# Patient Record
Sex: MALE | Race: OTHER | HISPANIC OR LATINO | Employment: FULL TIME | URBAN - METROPOLITAN AREA
[De-identification: names, ages, dates, MRNs, and addresses within clinical notes are randomized per-mention and may not be internally consistent; named-entity substitution may affect disease eponyms.]

---

## 2017-12-02 ENCOUNTER — HOSPITAL ENCOUNTER (EMERGENCY)
Facility: HOSPITAL | Age: 27
Discharge: HOME | End: 2017-12-02
Attending: EMERGENCY MEDICINE | Admitting: EMERGENCY MEDICINE

## 2017-12-02 VITALS
TEMPERATURE: 97 F | WEIGHT: 166 LBS | HEART RATE: 86 BPM | RESPIRATION RATE: 16 BRPM | OXYGEN SATURATION: 97 % | SYSTOLIC BLOOD PRESSURE: 124 MMHG | DIASTOLIC BLOOD PRESSURE: 73 MMHG

## 2017-12-02 DIAGNOSIS — R19.7 NAUSEA, VOMITING, AND DIARRHEA: Primary | ICD-10-CM

## 2017-12-02 DIAGNOSIS — R11.2 NAUSEA, VOMITING, AND DIARRHEA: Primary | ICD-10-CM

## 2017-12-02 DIAGNOSIS — H61.20 CERUMEN IMPACTION: ICD-10-CM

## 2017-12-02 DIAGNOSIS — H92.02 LEFT EAR PAIN: ICD-10-CM

## 2017-12-02 PROCEDURE — 99283 EMERGENCY DEPT VISIT LOW MDM: CPT

## 2017-12-02 RX ORDER — ONDANSETRON 4 MG/1
4 TABLET, FILM COATED ORAL EVERY 6 HOURS
Qty: 6 TABLET | Refills: 0 | Status: SHIPPED | OUTPATIENT
Start: 2017-12-02 | End: 2018-07-15

## 2017-12-02 RX ORDER — DICYCLOMINE HCL 20 MG
20 TABLET ORAL 2 TIMES DAILY
Qty: 10 TABLET | Refills: 0 | Status: SHIPPED | OUTPATIENT
Start: 2017-12-02 | End: 2018-07-15

## 2017-12-02 RX ORDER — DICYCLOMINE HYDROCHLORIDE 10 MG/1
20 CAPSULE ORAL ONCE
Status: COMPLETED | OUTPATIENT
Start: 2017-12-02 | End: 2017-12-02

## 2017-12-02 RX ORDER — ONDANSETRON 4 MG/1
4 TABLET, ORALLY DISINTEGRATING ORAL ONCE
Status: COMPLETED | OUTPATIENT
Start: 2017-12-02 | End: 2017-12-02

## 2017-12-02 RX ADMIN — ONDANSETRON 4 MG: 4 TABLET, ORALLY DISINTEGRATING ORAL at 12:53

## 2017-12-02 RX ADMIN — DICYCLOMINE HYDROCHLORIDE 20 MG: 10 CAPSULE ORAL at 12:52

## 2017-12-02 NOTE — DISCHARGE INSTRUCTIONS
Impactación de Cerumen   LO QUE NECESITA SABER:   La impactación de cerumen es la obstrucción del conducto auditivo externo con cerumen (cera del oído) comprimido apretadamente  Generalmente se trata con procedimientos nasir irrigación o succión del canal auditivo o con el uso de instrumentos para remover la impactación  INSTRUCCIONES SOBRE EL IZABELA HOSPITALARIA:   Medicamentos:  · Gotas óticas:  Se utiliza las gotas de oído para ablandar la cera en maria oído  Las gotas óticas para ablandar la cera del oído son de [place]  Consulte con maria médico qué tan seguido debe Lady Kamara  Brandy las instrucciones cuidadosamente antes de [employer]  Swathi lo siguiente cuando se coloque gotas para ojos:     ¨ Tibie las gotas sosteniendo el envase en las elias julio unos minutos  Las gotas frías podrían [place] [employer]  ¨ Acuéstese con el oído afectado [place]  Usted también se puede parar con maria lucretia inclinada hacia un lado  ¨ Jale el lóbulo del oído [place] y atrás, y coloque la cantidad de gotas adecuadas en el oído  ¨ Mantenga maria lucretia inclinada por 5 a 10 minutos para que las gotas cubran la parte exterior del canal auditivo  ¨ Limpie la parte exterior del oído con un bastoncillo de algodón  No coloque el bastoncillo ni cualquier otra cosa dentro de maria canal auditivo  [place] aumenta el riesgo de daño a maria tímpano  · [place] cat medicamentos nasir se le haya indicado  Consulte con maria médico si usted christa que maria medicamento no le está ayudando o si presenta efectos secundarios  Infórmele si es alérgico a algún medicamento  Mantenga chandu lista actualizada de los [place], las vitaminas y los productos herbales que lorenzo  Incluya los siguientes datos de los medicamentos: cantidad, frecuencia y motivo de administración  Traiga con usted la lista o los envases de la píldoras a cat citas de seguimiento   Lleve la lista de los medicamentos con usted en carolyn de [place] emergencia  Acuda a cat consultas de control con ck médico según le indicaron  Anote cat preguntas para que se acuerde de hacerlas julio cat visitas  Pregúntele a kc Demetra Kay vitaminas y minerales son adecuados para usted  · Usted tiene fiebre  · Usted tiene dificultad para oír u oye zumbidos  · Tiene alguna pregunta acerca de kc condición o cuidado  Regrese a la emigdio de emergencias si:   · Usted se siente mareado  · Usted tiene pus o benita que proviene de kc oído  · Kc dolor de oído no desaparece o aumenta  © 2017 2600 Mark Smith Information is for End User's use only and may not be sold, redistributed or otherwise used for commercial purposes  All illustrations and images included in CareNotes® are the copyrighted property of A D A M , Inc  or Osmany Reina  Esta información es sólo para uso en educación  Kc intención no es darle un consejo médico sobre enfermedades o tratamientos  Colsulte con kc Daily Every farmacéutico antes de seguir cualquier régimen médico para saber si es seguro y efectivo para usted  Gastroenteritis   LO QUE NECESITA SABER:   La gastroenteritis, o gripe estomacal, es chandu infección del estómago y los intestinos  INSTRUCCIONES SOBRE EL IZABELA HOSPITALARIA:   Llame al 911 en carolyn de presentar lo siguiente:   · Usted tiene dificultad para respirar o pulso acelerado  Regrese a la emigdio de emergencias si:   · Usted ve benita en kc diarrea  · Usted no puede dejar de vomitar  · Usted no ha orinado en 12 horas  · Usted siente que se va a desmayar  Pregúntele a kc Demetra Aky vitaminas y minerales son adecuados para usted  · Usted tiene fiebre  · Usted continúa con vómitos o diarrea aún después del tratamiento  · Usted ve lombrices en kc diarrea  · Kc boca u ojos están secos  Usted no está orinando tanto o con la misma frecuencia      · Usted tiene preguntas o inquietudes acerca de kc condición o cuidado  Medicamentos:   · Medicamentos,  se pueden administrar para [employer] vómitos o la diarrea, disminuir los calambres abdominales o tratar chandu infección  · [place] cat medicamentos nasir se le haya indicado  Consulte con maria médico si usted christa que maria medicamento no le está ayudando o si presenta efectos secundarios  Infórmele si es alérgico a cualquier medicamento  Mantenga chandu lista actualizada de los [place], las vitaminas y los productos herbales que lorenzo  Incluya los siguientes datos de los medicamentos: cantidad, frecuencia y motivo de administración  Traiga con usted la lista o los envases de la píldoras a cat citas de seguimiento  Lleve la lista de los medicamentos con usted en carolyn de chandu emergencia  [place] de maria síntomas:   · [place] líquidos nasir se le haya indicado  Pregunte a maria médico qué cantidad de líquido debe beber a diario y qué líquidos le recomienda  Es posible que también necesite yesenia chandu solución de rehidratación oral (SRO)  Chandu SRO contiene la cantidad [place] de azúcar, sal y minerales en agua para reponer los líquidos corporales  · Consuma alimentos blandos  Cuando usted sienta [place], empiece a comer alimentos suaves y blandos  Ejemplos son los plátanos, sopas claras, suzy y puré de [place]  No consuma productos lácteos, alcohol, bebidas azucaradas, o bebidas con cafeína hasta que se sienta mejor  · Descanse el mayor tiempo posible  Cuando se empiece a sentir mejor, comience poco a poco a hacer más cada día  Evite la propagación de la gastroenteritis:  La gastroenteritis se puede propagar fácilmente  Manténgase usted, maria ruthann y cat alrededores limpios para ayudar a evitar la propagación de la gastroenteritis:  · Lávese las elias frecuentemente  Utilice agua y Vasiliy  [employer] las elias después de usar el baño, cambiarle el pañal a un pete o estornudar  Lávese las elias antes de comer o preparar alimentos             · [address] y lave la ropa con frecuencia  Lave kc ropa y cat toallas por separado del jhoan de la ropa  Limpie las superficies de kc hogar con limpiador antibacterial o con blanqueador  · Lave y cocine servando los alimentos  Lave las verduras crudas antes de cocinar  [address] y [place]  No utilice los mismos platos para las carmelita crudas que para otros alimentos  Ponga en el refrigerador inmediatamente cualquier alimento que haya sobrado  · Esté alerta cuando usted vaya de campamento o cuando viaje  Solamente tome agua limpia  No tome agua de los denita o nicolás a menos que usted purifique o hierva el agua jairon  Cuando viaje, tome agua embotellada y no le ponga hielo  No coma fruta con la cáscara  No coma pescado crudo o carmelita que no están cocinadas completamente  Acuda a cat consultas de control con kc médico según le indicaron  Anote cat preguntas para que se acuerde de hacerlas julio cat visitas  © 2017 2600 Mark Smith Information is for End User's use only and may not be sold, redistributed or otherwise used for commercial purposes  All illustrations and images included in CareNotes® are the copyrighted property of A D A M , Inc  or Osmany Reina  Esta información es sólo para uso en educación  Kc intención no es darle un consejo médico sobre enfermedades o tratamientos  Colsulte con kc [place] farmacéutico antes de seguir cualquier régimen médico para saber si es seguro y efectivo para usted

## 2017-12-02 NOTE — ED PROVIDER NOTES
History  Chief Complaint   Patient presents with    Vomiting     pt was c/o left ear pain and didn't know if it was due to an infection or if he took a hit to the head while he was praciting boxing  bought OTC drops and then started just not feeling well and vomiting and diarrhea  also noticed a wing of maybe a bug come out of his ear   +sick contacts with similar sx  Having left ear pain x 1 week  Thought it was due to boxing  Went to pharmacy and was given something "organic for pain "  A "wing or something" came out of his left ear  Having discharge "like when your ear is dirty "  He reports having "high" fevers at home, but doesn't know the temp  Also having vomiting  "Everyone is sick in the house "  And "so much diarrhea that we had to disintoxicate the house "        History provided by:  Patient   used: Yes    Vomiting   Timing:  Intermittent  Quality:  Stomach contents  Progression:  Unchanged  Chronicity:  New  Recent urination:  Normal  Relieved by:  Nothing  Worsened by:  Nothing  Ineffective treatments:  None tried  Associated symptoms: abdominal pain, diarrhea, fever and sore throat    Associated symptoms: no chills and no cough    Risk factors: sick contacts        None       Past Medical History:   Diagnosis Date    No known health problems        History reviewed  No pertinent surgical history  History reviewed  No pertinent family history  I have reviewed and agree with the history as documented  Social History   Substance Use Topics    Smoking status: Never Smoker    Smokeless tobacco: Never Used    Alcohol use No        Review of Systems   Constitutional: Positive for fever  Negative for chills  HENT: Positive for ear discharge, ear pain and sore throat  Negative for congestion, postnasal drip, rhinorrhea, sinus pressure, sneezing and trouble swallowing  Eyes: Negative for discharge and redness     Respiratory: Negative for cough, choking and shortness of breath  Cardiovascular: Negative for chest pain  Gastrointestinal: Positive for abdominal pain, diarrhea, nausea and vomiting  Skin: Negative for rash  All other systems reviewed and are negative  Physical Exam  ED Triage Vitals [12/02/17 1222]   Temperature Pulse Respirations Blood Pressure SpO2   (!) 97 °F (36 1 °C) 86 16 124/73 97 %      Temp Source Heart Rate Source Patient Position - Orthostatic VS BP Location FiO2 (%)   Temporal -- -- -- --      Pain Score       8           Orthostatic Vital Signs  Vitals:    12/02/17 1222   BP: 124/73   Pulse: 86       Physical Exam   Constitutional: He is oriented to person, place, and time  He appears well-developed and well-nourished  No distress  HENT:   Head: Normocephalic  Right Ear: Tympanic membrane normal    Mouth/Throat: Oropharynx is clear and moist and mucous membranes are normal    Left ear cerumen impaction   Neck: Normal range of motion  Neck supple  Cardiovascular: Normal rate, regular rhythm, normal heart sounds and intact distal pulses  Exam reveals no gallop and no friction rub  No murmur heard  Pulmonary/Chest: Effort normal and breath sounds normal  No respiratory distress  He has no wheezes  He has no rales  Abdominal: Soft  Normal appearance and bowel sounds are normal  He exhibits no distension and no mass  There is no hepatosplenomegaly  There is no tenderness  There is no rigidity, no rebound, no guarding, no CVA tenderness, no tenderness at McBurney's point and negative Alegria's sign  Lymphadenopathy:     He has no cervical adenopathy  Neurological: He is alert and oriented to person, place, and time  Skin: Skin is warm, dry and intact  No rash noted  No pallor  Nursing note and vitals reviewed        ED Medications  Medications   ondansetron (ZOFRAN-ODT) dispersible tablet 4 mg (4 mg Oral Given 12/2/17 1253)   dicyclomine (BENTYL) capsule 20 mg (20 mg Oral Given 12/2/17 1252)       Diagnostic Studies  Results Reviewed     None                 No orders to display              Procedures  Procedures       Phone Contacts  ED Phone Contact    ED Course  ED Course                                MDM  CritCare Time    Disposition  Final diagnoses:   Nausea, vomiting, and diarrhea   Left ear pain   Cerumen impaction - Left ear     Time reflects when diagnosis was documented in both MDM as applicable and the Disposition within this note     Time User Action Codes Description Comment    12/2/2017 12:53 PM Boubacar, [employer] [R11 2,  R19 7] Nausea, vomiting, and diarrhea     12/2/2017 12:54 PM Boubacar, [employer] [H92 02] Left ear pain     12/2/2017 12:54 PM Boubacar, [employer] [H61 20] Cerumen impaction     12/2/2017 12:54 PM Mare Hamlin [H61 20] Cerumen impaction Left ear      ED Disposition     ED Disposition Condition Comment    Discharge  Carisa Forward discharge to home/self care  Condition at discharge: Good        Follow-up Information     Follow up With Specialties Details Why [address] Lasha[address]  Schedule an appointment as soon as possible for a visit  Carri [address]  [phone]          Discharge Medication List as of 12/2/2017 12:56 PM      START taking these medications    Details   carbamide peroxide (DEBROX) 6 5 % otic solution Administer 5 drops into ears 2 (two) times a day, Starting Sat 12/2/2017, Print      dicyclomine (BENTYL) 20 mg tablet Take 1 tablet by mouth 2 (two) times a day, Starting Sat 12/2/2017, Print      ondansetron (ZOFRAN) 4 mg tablet Take 1 tablet by mouth every 6 (six) hours, Starting Sat 12/2/2017, Print           No discharge procedures on file      ED Provider  Electronically Signed by           Isaias Logan 25, DO  12/02/17 1632

## 2018-07-15 ENCOUNTER — HOSPITAL ENCOUNTER (EMERGENCY)
Facility: HOSPITAL | Age: 28
Discharge: HOME | End: 2018-07-15
Attending: EMERGENCY MEDICINE | Admitting: EMERGENCY MEDICINE

## 2018-07-15 VITALS
OXYGEN SATURATION: 100 % | HEIGHT: 71 IN | TEMPERATURE: 97.8 F | RESPIRATION RATE: 12 BRPM | DIASTOLIC BLOOD PRESSURE: 69 MMHG | HEART RATE: 60 BPM | SYSTOLIC BLOOD PRESSURE: 129 MMHG

## 2018-07-15 DIAGNOSIS — B36.9 OTOMYCOSIS OF LEFT EAR: Primary | ICD-10-CM

## 2018-07-15 DIAGNOSIS — H62.42 OTOMYCOSIS OF LEFT EAR: Primary | ICD-10-CM

## 2018-07-15 PROCEDURE — 99282 EMERGENCY DEPT VISIT SF MDM: CPT

## 2018-07-15 RX ORDER — CLOTRIMAZOLE 1 G/ML
SOLUTION TOPICAL
Qty: 10 ML | Refills: 0 | Status: SHIPPED | OUTPATIENT
Start: 2018-07-15 | End: 2019-07-30 | Stop reason: ALTCHOICE

## 2018-07-15 RX ORDER — OFLOXACIN 3 MG/ML
SOLUTION/ DROPS OPHTHALMIC
Qty: 10 ML | Refills: 0 | Status: SHIPPED | OUTPATIENT
Start: 2018-07-15 | End: 2019-07-30 | Stop reason: ALTCHOICE

## 2018-07-16 NOTE — DISCHARGE INSTRUCTIONS
Otitis externa   LO QUE NECESITA SABER:   La otitis externa u oído de nadador, es chandu infección en el conducto auditivo externo  Yamileth conducto va desde la parte externa del oído hasta el tímpano  INSTRUCCIONES SOBRE EL IZABELA HOSPITALARIA:   Regrese a la emigdio de emergencias si:   · Usted tiene dolor intenso en el oído  · Usted de repente no puede oir  · Usted de tiene nueva inflamación en la gala, detrás de bonita oídos o de maria latrice  · Usted repentinamente no puede  chandu parte de maria gala  · Maria joslyn repentinamente se siente adormecido  Pregúntele a maria Fredrich Files vitaminas y minerales son adecuados para usted  · Usted tiene fiebre  · Bonita signos y síntomas no mejoran después de 2 días de tratamiento  · Bonita signos y síntomas desaparecen por un tiempo, mirian después regresan  · Usted tiene preguntas o inquietudes acerca de maria condición o cuidado  Medicamentos:   · AINEs (Analgésicos antiinflamatorios no esteroides) nasir el ibuprofeno, ayudan a disminuir la inflamación, el dolor y la fiebre  Yamileth medicamento esta disponible con o sin chandu receta médica  Los AINEs pueden causar sangrado estomacal o problemas renales en ciertas personas  Si usted esta tomando un anticoágulante,  siempre  pregunte si los AINEs son seguros para usted  Siempre bernard la etiqueta de yamileth medicamento y [place] instrucciones  No administre yamileth medicamento a niños menores de 6 meses de lynette sin antes obtener la autorización de maria médico      · El acetaminofén  karis el dolor y baja la fiebre  Está disponible sin receta médica  Pregunte la cantidad y la frecuencia con que debe tomarlos  [id number]  El acetaminofén puede causar daño en el hígado cuando no se lorenzo de forma correcta  · Gotas para los oídos  Podrían darle gotas para los oídosque contengan antibiótico  El antibiótico [place] a tratar chandu infección bacteriana  Es posible que también le den medicamentos esteroideos   Los esteroides ayudan a disminuir el enrojecimiento, la inflamación y el dolor  · [place] cat medicamentos nasir se le haya indicado  Consulte con kc médico si usted christa que kc medicamento no le está ayudando o si presenta efectos secundarios  Infórmele si es alérgico a cualquier medicamento  Mantenga chandu lista actualizada de los [place], las vitaminas y los productos herbales que lorenzo  Incluya los siguientes datos de los medicamentos: cantidad, frecuencia y motivo de administración  Traiga con usted la lista o los envases de la píldoras a cat citas de seguimiento  Lleve la lista de los medicamentos con usted en carolyn de chandu emergencia  Acuda a cat consultas de control con kc médico según le indicaron  Anote cat preguntas para que se acuerde de hacerlas julio cat visitas  [employer] marissa del oído:   · Acuéstese de lado con el oído infectado [place] arriba  · Coloque con cuidado el número correcto de gotas dentro del oído  Si es posible, pida ayuda a otra persona  · Con cuidado mueva la parte externa de kc oreja hacia atrás y [place] adelante para ayudar a que el medicamento llegue hasta kc canal auditivo  · Permanezca acostado en la misma posición (con el oído Gerald) de 3 a 5 minutos  Evite la otitis externa:   · No coloque hisopos de algodón u objetos extraños en cat oídos  · Envuelva un paño húmedo y limpio alrededor del dedo y úselo para limpiar la parte exterior de kc oído y remover la cera adicional      · Use tapones en los oídos cuando nade  Seque la parte exterior de kc oído completamente después de nadar o de bañarse  © 2017 2600 Mark Smith Information is for End User's use only and may not be sold, redistributed or otherwise used for commercial purposes  All illustrations and images included in CareNotes® are the copyrighted property of A D A M , Inc  or Osmany Reina  Esta información es sólo para uso en educación   Kc intención no es darle un consejo [employer]el [employer] Ant[employer] enfermedades o tratamientos  Colsulte con maria Ladean Artist farmacéutico antes de seguir cualquier régimen médico para saber si es seguro y efectivo para usted

## 2018-07-16 NOTE — ED PROVIDER NOTES
History  Chief Complaint   Patient presents with   Farnaz Candelaria     Started this week with burning in his ear  Girlfriend states "they noticed he had yellow and blackish stuff coming out of his here"  Denies fevers  This has happened before a couple of months ago and then he had it flushed awhile back  61-year-old previously healthy male presents emergency department for evaluation increasing left ear pain x1 week, with new onset otorrhea x2 days  Patient states he had a similar history of this 5-6 months prior, his left ear was irrigated was prescribed Debrox drops in due to excessive wax  He states that the problem has not ever gone away completely, however just acutely worsened in the past week  He denies frequent swimming, his occupation does not result in water in his ear  Denies associated fever, chills, sweats, tinnitus, vertigo, nausea,  He does not wear ear plugs frequently at work  None       Past Medical History:   Diagnosis Date    No known health problems        No past surgical history on file  No family history on file  I have reviewed and agree with the history as documented  Social History   Substance Use Topics    Smoking status: Never Smoker    Smokeless tobacco: Never Used    Alcohol use No        Review of Systems   Constitutional: Negative for chills, diaphoresis and fever  HENT: Positive for ear discharge and ear pain  Negative for congestion and sore throat  Respiratory: Negative for cough  Cardiovascular: Negative for chest pain  Gastrointestinal: Negative for diarrhea, nausea and vomiting  Musculoskeletal: Negative for arthralgias and myalgias  Skin: Negative for rash  Allergic/Immunologic: Negative for immunocompromised state  Neurological: Negative for dizziness and light-headedness  Physical Exam  Physical Exam   Constitutional: He is oriented to person, place, and time  He appears well-developed and well-nourished  No distress  HENT:   Head: Normocephalic and atraumatic  Right Ear: No drainage or swelling  Tympanic membrane is not injected and not erythematous  On initial exam the left EAC is occluded by exudates a black discharge  After irrigation of the ear, there is notable exudate throughout the EAC, TM is injected bulging with no purulent middle ear effusion, the EAC is markedly stenotic   Eyes: Pupils are equal, round, and reactive to light  No scleral icterus  Neck: No JVD present  Cardiovascular: Normal rate and regular rhythm  Exam reveals no gallop and no friction rub  No murmur heard  Pulmonary/Chest: No respiratory distress  He has no wheezes  He has no rales  Neurological: He is alert and oriented to person, place, and time  Skin: Skin is warm and dry  He is not diaphoretic  Vitals reviewed  Vital Signs  ED Triage Vitals [07/15/18 2154]   Temperature Pulse Respirations Blood Pressure SpO2   97 8 °F (36 6 °C) 60 12 129/69 100 %      Temp Source Heart Rate Source Patient Position - Orthostatic VS BP Location FiO2 (%)   Temporal -- Sitting Left arm --      Pain Score       --           Vitals:    07/15/18 2154   BP: 129/69   Pulse: 60   Patient Position - Orthostatic VS: Sitting       Visual Acuity      ED Medications  Medications - No data to display    Diagnostic Studies  Results Reviewed     None                 No orders to display              Procedures  Procedures       Phone Contacts  ED Phone Contact    ED Course                               MDM  Number of Diagnoses or Management Options  Otomycosis of left ear: new and does not require workup  Diagnosis management comments: 60-year-old healthy male presents emergency department for evaluation of left ear discharge in pain  On exam there is significant left external auditory canal narrowing black exudate  Upon removal of the black exudate, purulent discharge is noted, without evidence of acute otitis media    Clinical picture is consistent with acute otitis externa, with suspicion of otomycosis  He is immunocompetent and has not used any otic abx recently  Patient will be started on otic antibiotic as well as antifungal, referred to ENT for evaluation  Amount and/or Complexity of Data Reviewed  Decide to obtain previous medical records or to obtain history from someone other than the patient: yes  Obtain history from someone other than the patient: yes (Mother)  Review and summarize past medical records: yes    Patient Progress  Patient progress: stable    CritCare Time    Disposition  Final diagnoses:   Otomycosis of left ear     Time reflects when diagnosis was documented in both MDM as applicable and the Disposition within this note     Time User Action Codes Description Comment    7/15/2018 10:09 PM Dean Munguia Add [B36 9,  H62 42] Otomycosis of left ear       ED Disposition     ED Disposition Condition Comment    Discharge  Kimberly Munoz discharge to home/self care  Condition at discharge: Good        Follow-up Information     Follow up With Specialties Details Why [address],  Otolaryngology Schedule an appointment as soon as possible for a visit  [address] [place] [address]            Patient's Medications   Discharge Prescriptions    CLOTRIMAZOLE 1 % EXTERNAL SOLUTION    Two drops L ear bid x 10 days       Start Date: 7/15/2018 End Date: --       Order Dose: --       Quantity: 10 mL    Refills: 0    OFLOXACIN (OCUFLOX) 0 3 % OPHTHALMIC SOLUTION    5 gtts L ear bid x 7d       Start Date: 7/15/2018 End Date: --       Order Dose: --       Quantity: 10 mL    Refills: 0     No discharge procedures on file      ED Provider  Electronically Signed by           Latasha Connelly PA-C  07/15/18 1570

## 2019-06-11 ENCOUNTER — HOSPITAL ENCOUNTER (EMERGENCY)
Facility: HOSPITAL | Age: 29
Discharge: HOME | End: 2019-06-11
Attending: EMERGENCY MEDICINE
Payer: COMMERCIAL

## 2019-06-11 VITALS
SYSTOLIC BLOOD PRESSURE: 121 MMHG | TEMPERATURE: 97 F | OXYGEN SATURATION: 99 % | WEIGHT: 171.08 LBS | HEART RATE: 64 BPM | RESPIRATION RATE: 18 BRPM | DIASTOLIC BLOOD PRESSURE: 54 MMHG | BODY MASS INDEX: 23.86 KG/M2

## 2019-06-11 DIAGNOSIS — L30.9 DERMATITIS: Primary | ICD-10-CM

## 2019-06-11 PROCEDURE — 99282 EMERGENCY DEPT VISIT SF MDM: CPT

## 2019-06-11 PROCEDURE — 99283 EMERGENCY DEPT VISIT LOW MDM: CPT | Performed by: PHYSICIAN ASSISTANT

## 2019-06-11 RX ORDER — PREDNISONE 20 MG/1
40 TABLET ORAL ONCE
Status: COMPLETED | OUTPATIENT
Start: 2019-06-11 | End: 2019-06-11

## 2019-06-11 RX ORDER — DIPHENHYDRAMINE HCL 25 MG
25 TABLET ORAL EVERY 6 HOURS
Qty: 20 TABLET | Refills: 0 | Status: SHIPPED | OUTPATIENT
Start: 2019-06-11 | End: 2019-07-30 | Stop reason: ALTCHOICE

## 2019-06-11 RX ORDER — DIPHENHYDRAMINE HCL 25 MG
25 TABLET ORAL ONCE
Status: COMPLETED | OUTPATIENT
Start: 2019-06-11 | End: 2019-06-11

## 2019-06-11 RX ORDER — PREDNISONE 10 MG/1
TABLET ORAL
Qty: 20 TABLET | Refills: 0 | Status: SHIPPED | OUTPATIENT
Start: 2019-06-11 | End: 2019-07-30 | Stop reason: ALTCHOICE

## 2019-06-11 RX ADMIN — PREDNISONE 40 MG: 20 TABLET ORAL at 19:51

## 2019-06-11 RX ADMIN — DIPHENHYDRAMINE HCL 25 MG: 25 TABLET ORAL at 19:51

## 2019-07-30 ENCOUNTER — HOSPITAL ENCOUNTER (EMERGENCY)
Facility: HOSPITAL | Age: 29
Discharge: HOME | End: 2019-07-31
Attending: EMERGENCY MEDICINE | Admitting: EMERGENCY MEDICINE
Payer: COMMERCIAL

## 2019-07-30 VITALS
DIASTOLIC BLOOD PRESSURE: 61 MMHG | RESPIRATION RATE: 16 BRPM | SYSTOLIC BLOOD PRESSURE: 114 MMHG | HEART RATE: 49 BPM | TEMPERATURE: 98 F | OXYGEN SATURATION: 100 % | BODY MASS INDEX: 23.71 KG/M2 | WEIGHT: 170 LBS

## 2019-07-30 DIAGNOSIS — K52.9 GASTROENTERITIS: Primary | ICD-10-CM

## 2019-07-30 LAB
ALBUMIN SERPL BCP-MCNC: 4.5 G/DL (ref 3–5.2)
ALP SERPL-CCNC: 70 U/L (ref 43–122)
ALT SERPL W P-5'-P-CCNC: 19 U/L (ref 9–52)
ANION GAP SERPL CALCULATED.3IONS-SCNC: 10 MMOL/L (ref 5–14)
AST SERPL W P-5'-P-CCNC: 23 U/L (ref 17–59)
BASOPHILS # BLD AUTO: 0 THOUSANDS/ΜL (ref 0–0.1)
BASOPHILS NFR BLD AUTO: 1 % (ref 0–1)
BILIRUB SERPL-MCNC: 0.5 MG/DL
BUN SERPL-MCNC: 21 MG/DL (ref 5–25)
CALCIUM SERPL-MCNC: 9.1 MG/DL (ref 8.4–10.2)
CHLORIDE SERPL-SCNC: 100 MMOL/L (ref 97–108)
CO2 SERPL-SCNC: 29 MMOL/L (ref 22–30)
CREAT SERPL-MCNC: 0.81 MG/DL (ref 0.7–1.5)
EOSINOPHIL # BLD AUTO: 0.2 THOUSAND/ΜL (ref 0–0.4)
EOSINOPHIL NFR BLD AUTO: 5 % (ref 0–6)
ERYTHROCYTE [DISTWIDTH] IN BLOOD BY AUTOMATED COUNT: 13.3 %
GFR SERPL CREATININE-BSD FRML MDRD: 122 ML/MIN/1.73SQ M
GLUCOSE SERPL-MCNC: 86 MG/DL (ref 70–99)
HCT VFR BLD AUTO: 38 % (ref 41–53)
HGB BLD-MCNC: 12.7 G/DL (ref 13.5–17.5)
LIPASE SERPL-CCNC: 59 U/L (ref 23–300)
LYMPHOCYTES # BLD AUTO: 2.3 THOUSANDS/ΜL (ref 0.5–4)
LYMPHOCYTES NFR BLD AUTO: 46 % (ref 25–45)
MCH RBC QN AUTO: 30.6 PG (ref 26–34)
MCHC RBC AUTO-ENTMCNC: 33.3 G/DL (ref 31–36)
MCV RBC AUTO: 92 FL (ref 80–100)
MONOCYTES # BLD AUTO: 0.4 THOUSAND/ΜL (ref 0.2–0.9)
MONOCYTES NFR BLD AUTO: 8 % (ref 1–10)
NEUTROPHILS # BLD AUTO: 2 THOUSANDS/ΜL (ref 1.8–7.8)
NEUTS SEG NFR BLD AUTO: 40 % (ref 45–65)
PLATELET # BLD AUTO: 212 THOUSANDS/UL (ref 150–450)
PMV BLD AUTO: 7.8 FL (ref 8.9–12.7)
POTASSIUM SERPL-SCNC: 4.1 MMOL/L (ref 3.6–5)
PROT SERPL-MCNC: 7.8 G/DL (ref 5.9–8.4)
RBC # BLD AUTO: 4.13 MILLION/UL (ref 4.5–5.9)
SODIUM SERPL-SCNC: 139 MMOL/L (ref 137–147)
WBC # BLD AUTO: 5 THOUSAND/UL (ref 4.5–11)

## 2019-07-30 PROCEDURE — 96375 TX/PRO/DX INJ NEW DRUG ADDON: CPT

## 2019-07-30 PROCEDURE — 85025 COMPLETE CBC W/AUTO DIFF WBC: CPT | Performed by: EMERGENCY MEDICINE

## 2019-07-30 PROCEDURE — 99283 EMERGENCY DEPT VISIT LOW MDM: CPT

## 2019-07-30 PROCEDURE — 96361 HYDRATE IV INFUSION ADD-ON: CPT

## 2019-07-30 PROCEDURE — 83690 ASSAY OF LIPASE: CPT | Performed by: EMERGENCY MEDICINE

## 2019-07-30 PROCEDURE — 80053 COMPREHEN METABOLIC PANEL: CPT | Performed by: EMERGENCY MEDICINE

## 2019-07-30 PROCEDURE — 36415 COLL VENOUS BLD VENIPUNCTURE: CPT | Performed by: EMERGENCY MEDICINE

## 2019-07-30 PROCEDURE — 96374 THER/PROPH/DIAG INJ IV PUSH: CPT

## 2019-07-30 PROCEDURE — 99283 EMERGENCY DEPT VISIT LOW MDM: CPT | Performed by: EMERGENCY MEDICINE

## 2019-07-30 RX ORDER — ONDANSETRON 2 MG/ML
4 INJECTION INTRAMUSCULAR; INTRAVENOUS ONCE
Status: COMPLETED | OUTPATIENT
Start: 2019-07-30 | End: 2019-07-30

## 2019-07-30 RX ADMIN — FAMOTIDINE 20 MG: 10 INJECTION, SOLUTION INTRAVENOUS at 22:58

## 2019-07-30 RX ADMIN — ONDANSETRON HYDROCHLORIDE 4 MG: 2 INJECTION, SOLUTION INTRAMUSCULAR; INTRAVENOUS at 22:58

## 2019-07-30 RX ADMIN — SODIUM CHLORIDE 1000 ML: 0.9 INJECTION, SOLUTION INTRAVENOUS at 22:58

## 2019-07-31 RX ORDER — DICYCLOMINE HCL 20 MG
20 TABLET ORAL 2 TIMES DAILY
Qty: 10 TABLET | Refills: 0 | Status: SHIPPED | OUTPATIENT
Start: 2019-07-31

## 2019-07-31 RX ORDER — ONDANSETRON 8 MG/1
8 TABLET, ORALLY DISINTEGRATING ORAL EVERY 8 HOURS PRN
Qty: 20 TABLET | Refills: 0 | Status: SHIPPED | OUTPATIENT
Start: 2019-07-31

## 2019-07-31 NOTE — ED PROVIDER NOTES
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  Cramping abdominal pain  None       Past Medical History:   Diagnosis Date    No known health problems        Past Surgical History:   Procedure Laterality Date    ANKLE SURGERY         History reviewed  No pertinent family history  I have reviewed and agree with the history as documented  Social History     Tobacco Use    Smoking status: Never Smoker    Smokeless tobacco: Never Used   Substance Use Topics    Alcohol use: No    Drug use: No        Review of Systems   Constitutional: Positive for chills  Gastrointestinal: Positive for abdominal distention, abdominal pain, diarrhea, nausea and vomiting  All other systems reviewed and are negative  Physical Exam  Physical Exam   Constitutional: He is oriented to person, place, and time  He appears well-developed and well-nourished  No distress  HENT:   Head: Normocephalic and atraumatic  Eyes: Pupils are equal, round, and reactive to light  EOM are normal    Neck: Normal range of motion  Neck supple  Cardiovascular: Normal rate  Pulmonary/Chest: Effort normal and breath sounds normal    Abdominal: Soft  Bowel sounds are normal  He exhibits no distension  There is no tenderness  Musculoskeletal: Normal range of motion  Neurological: He is alert and oriented to person, place, and time  Skin: Skin is warm and dry  Capillary refill takes less than 2 seconds     Psychiatric: He has a normal mood and affect  Vitals reviewed        Vital Signs  ED Triage Vitals [07/30/19 2217]   Temperature Pulse Respirations Blood Pressure SpO2   98 °F (36 7 °C) (!) 49 16 114/61 100 %      Temp Source Heart Rate Source Patient Position - Orthostatic VS BP Location FiO2 (%)   Tympanic Monitor Sitting Left arm --      Pain Score       No Pain           Vitals:    07/30/19 2217   BP: 114/61   Pulse: (!) 49   Patient Position - Orthostatic VS: Sitting         Visual Acuity      ED Medications  Medications   sodium chloride 0 9 % bolus 1,000 mL (1,000 mL Intravenous New Bag 7/30/19 2258)   ondansetron (ZOFRAN) injection 4 mg (4 mg Intravenous Given 7/30/19 2258)   famotidine (PEPCID) injection 20 mg (20 mg Intravenous Given 7/30/19 2258)       Diagnostic Studies  Results Reviewed     Procedure Component Value Units Date/Time    Comprehensive metabolic panel [[id number]]  (Normal) Collected:  07/30/19 2258    Lab Status:  Final result Specimen:  Blood from Arm, Left Updated:  07/30/19 2317     Sodium 139 mmol/L      Potassium 4 1 mmol/L      Chloride 100 mmol/L      CO2 29 mmol/L      ANION GAP 10 mmol/L      BUN 21 mg/dL      Creatinine 0 81 mg/dL      Glucose 86 mg/dL      Calcium 9 1 mg/dL      AST 23 U/L      ALT 19 U/L      Alkaline Phosphatase 70 U/L      Total Protein 7 8 g/dL      Albumin 4 5 g/dL      Total Bilirubin 0 50 mg/dL      eGFR 122 ml/min/1 73sq m     Narrative:       [place] guidelines for Chronic Kidney Disease (CKD):     Stage 1 with normal or high GFR (GFR > 90 mL/min/1 73 square meters)    Stage 2 Mild CKD (GFR = 60-89 mL/min/1 73 square meters)    Stage 3A Moderate CKD (GFR = 45-59 mL/min/1 73 square meters)    Stage 3B Moderate CKD (GFR = 30-44 mL/min/1 73 square meters)    Stage 4 Severe CKD (GFR = 15-29 mL/min/1 73 square meters)    Stage 5 End Stage CKD (GFR <15 mL/min/1 73 square meters)  Note: GFR calculation is accurate only with a steady state creatinine    Lipase [[id number]]  (Normal) Collected:  07/30/19 2258    Lab Status:  Final result Specimen:  Blood from Arm, Left Updated:  07/30/19 2317     Lipase 59 u/L     CBC and differential [[id number]]  (Abnormal) Collected:  07/30/19 2258    Lab Status:  Final result Specimen:  Blood from Arm, Left Updated:  07/30/19 2310     WBC 5 00 Thousand/uL      RBC 4 13 Million/uL      Hemoglobin 12 7 g/dL      Hematocrit 38 0 %      MCV 92 fL      MCH 30 6 pg      MCHC 33 3 g/dL      RDW 13 3 %      MPV 7 8 fL      Platelets 666 Thousands/uL      Neutrophils Relative 40 %      Lymphocytes Relative 46 %      Monocytes Relative 8 %      Eosinophils Relative 5 %      Basophils Relative 1 %      Neutrophils Absolute 2 00 Thousands/µL      Lymphocytes Absolute 2 30 Thousands/µL      Monocytes Absolute 0 40 Thousand/µL      Eosinophils Absolute 0 20 Thousand/µL      Basophils Absolute 0 00 Thousands/µL                  No orders to display              Procedures  Procedures       ED Course                               MDM    Disposition  Final diagnoses:   Gastroenteritis     Time reflects when diagnosis was documented in both MDM as applicable and the Disposition within this note     Time User Action Codes Description Comment    7/31/2019 12:00 AM Constanza Darling Add [K52 9] Gastroenteritis       ED Disposition     ED Disposition Condition Date/Time Comment    Discharge Stable Wed Jul 31, 2019 12:00 AM Bhavesh Shah discharge to home/self care              Follow-up Information     Follow up With Specialties Details Why Contact Info    Tawnya Young MD Family Medicine Schedule an appointment as soon as possible for a visit in 1 week As needed [address]  [address] [facility]  [address] [address]  [facility] [address]             Patient's Medications   Discharge Prescriptions    DICYCLOMINE (BENTYL) 20 MG TABLET    Take 1 tablet (20 mg total) by mouth 2 (two) times a day       Start Date: 7/31/2019 End Date: --       Order Dose: 20 mg Quantity: 10 tablet    Refills: 0    ONDANSETRON (ZOFRAN-ODT) 8 MG DISINTEGRATING TABLET    Take 1 tablet (8 mg total) by mouth every 8 (eight) hours as needed for nausea or vomiting       Start Date: 7/31/2019 End Date: --       Order Dose: 8 mg       Quantity: 20 tablet    Refills: 0     No discharge procedures on file      ED Provider  Electronically Signed by           Bessie Barrera DO  07/31/19 Tommy

## 2019-09-29 ENCOUNTER — HOSPITAL ENCOUNTER (EMERGENCY)
Facility: HOSPITAL | Age: 29
Discharge: HOME | End: 2019-09-29
Attending: EMERGENCY MEDICINE
Payer: COMMERCIAL

## 2019-09-29 VITALS
WEIGHT: 172.84 LBS | BODY MASS INDEX: 23.41 KG/M2 | OXYGEN SATURATION: 100 % | RESPIRATION RATE: 16 BRPM | SYSTOLIC BLOOD PRESSURE: 120 MMHG | TEMPERATURE: 98.3 F | DIASTOLIC BLOOD PRESSURE: 64 MMHG | HEIGHT: 72 IN | HEART RATE: 70 BPM

## 2019-09-29 DIAGNOSIS — S01.81XA FACIAL LACERATION, INITIAL ENCOUNTER: ICD-10-CM

## 2019-09-29 DIAGNOSIS — S01.85XA DOG BITE OF FACE, INITIAL ENCOUNTER: Primary | ICD-10-CM
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
## 2019-09-29 PROCEDURE — 90675 RABIES VACCINE IM: CPT | Performed by: PHYSICIAN ASSISTANT

## 2019-09-29 PROCEDURE — 12011 RPR F/E/E/N/L/M 2.5 CM/<: CPT | Performed by: PHYSICIAN ASSISTANT

## 2019-09-29 PROCEDURE — 90375 RABIES IG IM/SC: CPT | Performed by: PHYSICIAN ASSISTANT

## 2019-09-29 PROCEDURE — 99284 EMERGENCY DEPT VISIT MOD MDM: CPT | Performed by: PHYSICIAN ASSISTANT

## 2019-09-29 PROCEDURE — 90715 TDAP VACCINE 7 YRS/> IM: CPT | Performed by: PHYSICIAN ASSISTANT

## 2019-09-29 PROCEDURE — 96372 THER/PROPH/DIAG INJ SC/IM: CPT

## 2019-09-29 PROCEDURE — 90471 IMMUNIZATION ADMIN: CPT

## 2019-09-29 PROCEDURE — 99283 EMERGENCY DEPT VISIT LOW MDM: CPT

## 2019-09-29 RX ORDER — AMOXICILLIN AND CLAVULANATE POTASSIUM 875; 125 MG/1; MG/1
1 TABLET, FILM COATED ORAL EVERY 12 HOURS
Qty: 20 TABLET | Refills: 0 | Status: SHIPPED | OUTPATIENT
Start: 2019-09-29 | End: 2019-10-09

## 2019-09-29 RX ORDER — LIDOCAINE HYDROCHLORIDE 10 MG/ML
3 INJECTION, SOLUTION EPIDURAL; INFILTRATION; INTRACAUDAL; PERINEURAL ONCE
Status: COMPLETED | OUTPATIENT
Start: 2019-09-29 | End: 2019-09-29

## 2019-09-29 RX ADMIN — Medication 1 ML: at 17:19

## 2019-09-29 RX ADMIN — TETANUS TOXOID, REDUCED DIPHTHERIA TOXOID AND ACELLULAR PERTUSSIS VACCINE, ADSORBED 0.5 ML: 5; 2.5; 8; 8; 2.5 SUSPENSION INTRAMUSCULAR at 17:17

## 2019-09-29 RX ADMIN — LIDOCAINE HYDROCHLORIDE 3 ML: 10 INJECTION, SOLUTION EPIDURAL; INFILTRATION; INTRACAUDAL; PERINEURAL at 17:23

## 2019-09-29 RX ADMIN — RABIES IMMUNE GLOBULIN (HUMAN) 1560 UNITS: 300 INJECTION, SOLUTION INFILTRATION; INTRAMUSCULAR at 17:35

## 2019-09-29 NOTE — ED PROVIDER NOTES
History  Chief Complaint   Patient presents with    Dog Bite     pta 30 min  left side of chin-laceration from unknown dog  Patient is a 28-year-old male with no significant past medical history who presents with dog bite to left chin just prior to arrival   Patient states that he was out in his backyard when he noted a stray dog coming towards the yard, where patient had his family dog  Patient states he bent down to  his family dog when the stray dog bit him on the left side of his chin  He states the dog ran away, the dog did not have a collar, and he does not know the owner  Patient notes pain on laceration to the left side of his chin, states he is able to move his mouth without issue and denies any injuries on the inside of his mouth, or any other injuries  He denies any numbness, tingling, weakness on his face  Patient is unsure of his last tetanus shot  Patient states he is otherwise in his usual state of health and denies any fevers, chills, diaphoresis, headaches, vision changes, neck pain or stiffness, congestion, cough, shortness of breath, chest pain, abdominal pain, nausea, vomiting, diarrhea, urinary changes, or rash  Prior to Admission Medications   Prescriptions Last Dose Informant Patient Reported? Taking?   dicyclomine (BENTYL) 20 mg tablet   No No   Sig: Take 1 tablet (20 mg total) by mouth 2 (two) times a day   ondansetron (ZOFRAN-ODT) 8 mg disintegrating tablet   No No   Sig: Take 1 tablet (8 mg total) by mouth every 8 (eight) hours as needed for nausea or vomiting      Facility-Administered Medications: None       Past Medical History:   Diagnosis Date    No known health problems        Past Surgical History:   Procedure Laterality Date    ANKLE SURGERY         History reviewed  No pertinent family history  I have reviewed and agree with the history as documented      Social History     Tobacco Use    Smoking status: Never Smoker    Smokeless tobacco: Never Used Substance Use Topics    Alcohol use: No    Drug use: No        Review of Systems   Constitutional: Negative for chills, diaphoresis and fever  HENT: Negative for congestion and sore throat  Eyes: Negative for visual disturbance  Respiratory: Negative for cough, shortness of breath, wheezing and stridor  Cardiovascular: Negative for chest pain and palpitations  Gastrointestinal: Negative for abdominal pain, diarrhea, nausea and vomiting  Genitourinary: Negative for difficulty urinating  Musculoskeletal: Negative for myalgias, neck pain and neck stiffness  Skin: Positive for wound  Negative for color change, pallor and rash  Dog bite to left chin   Neurological: Negative for dizziness, weakness, light-headedness, numbness and headaches  All other systems reviewed and are negative  Physical Exam  Physical Exam   Constitutional: He is oriented to person, place, and time  Vital signs are normal  He appears well-developed and well-nourished  He is active and cooperative  Non-toxic appearance  He does not have a sickly appearance  He does not appear ill  No distress  HENT:   Head: Normocephalic and atraumatic  Right Ear: Tympanic membrane, external ear and ear canal normal    Left Ear: Tympanic membrane, external ear and ear canal normal    Nose: Nose normal    Mouth/Throat: Uvula is midline, oropharynx is clear and moist and mucous membranes are normal    No internal mucosa damage, puncture wound noted  Eyes: Pupils are equal, round, and reactive to light  Conjunctivae and EOM are normal    Neck: Normal range of motion  Neck supple  Cardiovascular: Normal rate, regular rhythm, S1 normal, S2 normal, normal heart sounds, intact distal pulses and normal pulses  Pulmonary/Chest: Effort normal and breath sounds normal  No stridor  No respiratory distress  He has no decreased breath sounds  He has no wheezes  Abdominal: Soft   Bowel sounds are normal  He exhibits no distension  There is no tenderness  Musculoskeletal: Normal range of motion  Neurological: He is alert and oriented to person, place, and time  Skin: Skin is warm and dry  Capillary refill takes less than 2 seconds  He is not diaphoretic  Nursing note and vitals reviewed  Vital Signs  ED Triage Vitals [09/29/19 1632]   Temperature Pulse Respirations Blood Pressure SpO2   98 3 °F (36 8 °C) 70 16 120/64 100 %      Temp src Heart Rate Source Patient Position - Orthostatic VS BP Location FiO2 (%)   -- -- -- -- --      Pain Score       3           Vitals:    09/29/19 1632   BP: 120/64   Pulse: 70         Visual Acuity      ED Medications  Medications   tetanus-diphtheria-acellular pertussis (BOOSTRIX) IM injection 0 5 mL (0 5 mL Intramuscular Given 9/29/19 1717)   lidocaine (PF) (XYLOCAINE-MPF) 1 % injection 3 mL (3 mL Infiltration Given by Other 9/29/19 1723)   rabies vaccine, human diploid (IMOVAX RABIES) IM injection 1 mL (1 mL Intramuscular Given 9/29/19 1719)   rabies immune globulin, human (HyperRAB) injection 1,560 Units (1,560 Units Infiltration Given 9/29/19 1735)       Diagnostic Studies  Results Reviewed     None                 No orders to display              Procedures  Laceration repair  Date/Time: 9/29/2019 5:40 PM  Performed by: Reggie Peck PA-C  Authorized by: Reggie Peck PA-C   Consent: Verbal consent obtained  Risks and benefits: risks, benefits and alternatives were discussed  Consent given by: patient  Patient understanding: patient states understanding of the procedure being performed  Patient identity confirmed: verbally with patient  Time out: Immediately prior to procedure a "time out" was called to verify the correct patient, procedure, equipment, support staff and site/side marked as required    Body area: head/neck  Location details: chin  Laceration length: 1 5 cm  Foreign bodies: no foreign bodies  Tendon involvement: none  Nerve involvement: none  Vascular damage: no  Anesthesia: local infiltration    Anesthesia:  Local Anesthetic: lidocaine 1% without epinephrine      Procedure Details:  Preparation: Patient was prepped and draped in the usual sterile fashion  Irrigation solution: saline  Irrigation method: jet lavage and syringe  Amount of cleaning: extensive  Debridement: none  Degree of undermining: none  Skin closure: 6-0 nylon  Number of sutures: 3  Technique: simple  Approximation: close  Approximation difficulty: simple  Patient tolerance: Patient tolerated the procedure well with no immediate complications  Comments: Three sutures placed to prevent opening of wound with opening of the mouth  ED Course                               MDM  Number of Diagnoses or Management Options  Dog bite of face, initial encounter:   Facial laceration, initial encounter:   Diagnosis management comments: Patient's tetanus updated, patient provided with rabies immunoglobulin and vaccines  Stressed importance of follow-up for repeat vaccinations on days 3, 7, 14  Laceration closed after extensive cleaning because wound would open with mouth opening  Three sutures place, patient tolerated procedure well  Reviewed laceration and suture care at home  Patient provided with Augmentin  Patient instructed to have sutures removed in 5 days  Reviewed symptomatic treatment at home  Recommended follow-up with PCP as needed  Reviewed red flags symptoms and strict return to ED instructions  Patient notes understanding and agrees to plan  Disposition  Final diagnoses:   Dog bite of face, initial encounter   Facial laceration, initial encounter     Time reflects when diagnosis was documented in both MDM as applicable and the Disposition within this note     Time User Action Codes Description Comment    9/29/2019  6:05 PM Ashley [address] [place] [address]  [id number]] Dog bite of face, initial encounter     9/29/2019  6:05 PM Jazlyn Ramirez Add [[id number] [id number]] Facial laceration, initial encounter       ED Disposition     ED Disposition Condition Date/Time Comment    Discharge Stable Sun Sep 29, 2019  6:05 PM Wanda Dunlap discharge to home/self care  Follow-up Information     Follow up With Specialties Details Why Contact Info    Marleen Brumfield MD Family Medicine  As needed [address]  [address] [facility]  [place]  [address]  [phone]      [facility] Emergency Department Emergency Medicine  If symptoms worsen [address] [address] [phone]          Discharge Medication List as of 9/29/2019  6:09 PM      START taking these medications    Details   amoxicillin-clavulanate (AUGMENTIN) 875-125 mg per tablet Take 1 tablet by mouth every 12 (twelve) hours for 10 days, Starting Sun 9/29/2019, Until Wed 10/9/2019, Print         CONTINUE these medications which have NOT CHANGED    Details   dicyclomine (BENTYL) 20 mg tablet Take 1 tablet (20 mg total) by mouth 2 (two) times a day, Starting Wed 7/31/2019, Print      ondansetron (ZOFRAN-ODT) 8 mg disintegrating tablet Take 1 tablet (8 mg total) by mouth every 8 (eight) hours as needed for nausea or vomiting, Starting Wed 7/31/2019, Print           No discharge procedures on file      ED Provider  Electronically Signed by           Jony Ramon PA-C  09/29/19 [id number]